# Patient Record
Sex: FEMALE | Race: BLACK OR AFRICAN AMERICAN | Employment: FULL TIME | URBAN - METROPOLITAN AREA
[De-identification: names, ages, dates, MRNs, and addresses within clinical notes are randomized per-mention and may not be internally consistent; named-entity substitution may affect disease eponyms.]

---

## 2018-03-29 ENCOUNTER — OFFICE VISIT (OUTPATIENT)
Dept: ENDOCRINOLOGY | Age: 33
End: 2018-03-29

## 2018-03-29 VITALS
SYSTOLIC BLOOD PRESSURE: 131 MMHG | OXYGEN SATURATION: 97 % | HEIGHT: 62 IN | RESPIRATION RATE: 18 BRPM | WEIGHT: 293 LBS | HEART RATE: 86 BPM | BODY MASS INDEX: 53.92 KG/M2 | TEMPERATURE: 98.1 F | DIASTOLIC BLOOD PRESSURE: 74 MMHG

## 2018-03-29 DIAGNOSIS — E66.01 CLASS 3 SEVERE OBESITY DUE TO EXCESS CALORIES WITH SERIOUS COMORBIDITY AND BODY MASS INDEX (BMI) OF 50.0 TO 59.9 IN ADULT (HCC): ICD-10-CM

## 2018-03-29 DIAGNOSIS — E78.2 MIXED HYPERLIPIDEMIA: ICD-10-CM

## 2018-03-29 DIAGNOSIS — E11.65 TYPE 2 DIABETES MELLITUS WITH HYPERGLYCEMIA, UNSPECIFIED LONG TERM INSULIN USE STATUS: Primary | ICD-10-CM

## 2018-03-29 DIAGNOSIS — I10 ESSENTIAL HYPERTENSION: ICD-10-CM

## 2018-03-29 LAB
GLUCOSE POC: 190 MG/DL
HBA1C MFR BLD HPLC: 9.9 %

## 2018-03-29 RX ORDER — INSULIN GLARGINE 100 [IU]/ML
73 INJECTION, SOLUTION SUBCUTANEOUS
COMMUNITY
End: 2018-03-29 | Stop reason: SDUPTHER

## 2018-03-29 RX ORDER — GLIMEPIRIDE 2 MG/1
TABLET ORAL
COMMUNITY
End: 2018-04-04 | Stop reason: ALTCHOICE

## 2018-03-29 RX ORDER — LISINOPRIL AND HYDROCHLOROTHIAZIDE 20; 25 MG/1; MG/1
TABLET ORAL DAILY
COMMUNITY

## 2018-03-29 RX ORDER — ATORVASTATIN CALCIUM 20 MG/1
TABLET, FILM COATED ORAL DAILY
COMMUNITY

## 2018-03-29 RX ORDER — LANCETS 33 GAUGE
EACH MISCELLANEOUS
Qty: 200 LANCET | Refills: 6 | Status: SHIPPED | OUTPATIENT
Start: 2018-03-29

## 2018-03-29 RX ORDER — INSULIN GLARGINE 100 [IU]/ML
70 INJECTION, SOLUTION SUBCUTANEOUS
Qty: 30 ML | Refills: 6 | Status: SHIPPED | OUTPATIENT
Start: 2018-03-29

## 2018-03-29 NOTE — PATIENT INSTRUCTIONS
Do not skip meals  Do not eat in between meals    Reduce carbs- pasta, rice, potatoes, bread   Do not drink juices or sodas  Do not eat peanut butter     Do not eat sugar free cookies and cakes   Do not eat peaches, grapes, pineapples , raisins and oranges     -------------------------------------------------------------------------------------------------------------------      Check blood sugars before meals and bed time     Stay on lantus 70 units  At night     Stop amaryl       My plan is to start on starlix 120 mg 3 times before each meal   After labs

## 2018-03-29 NOTE — MR AVS SNAPSHOT
49 Count includes the [facility] [address] 
[phone] Patient: Evelin Daily MRN: [id number] VGL:[id number] Visit Information Date & Time Provider Department Dept. Phone Encounter #  
 3/29/2018  3:00 PM Dea Cabral MD [facility] [phone] [id number] Follow-up Instructions Return in about 4 weeks (around 4/26/2018). Upcoming Health Maintenance Date Due DTaP/Tdap/Td series (1 - Tdap) 1/17/2006 PAP AKA CERVICAL CYTOLOGY 1/17/2006 Influenza Age 5 to Adult 8/1/2017 Allergies as of 3/29/2018  Review Complete On: 3/29/2018 By: Dea Cabral MD  
  
 Severity Noted Reaction Type Reactions Tradjenta [Linagliptin] High 03/29/2018    Anaphylaxis Aspirin  03/29/2018    Hives Metformin  03/29/2018    Hives Current Immunizations  Never Reviewed No immunizations on file. Not reviewed this visit You Were Diagnosed With   
  
 Codes Comments Type 2 diabetes mellitus with hyperglycemia, unspecified long term insulin use status (HCC)    -  Primary ICD-10-CM: E11.65 ICD-9-CM: 250.00 Vitals BP Pulse Temp Resp Height(growth percentile) Weight(growth percentile) 131/74 (BP 1 Location: Right arm, BP Patient Position: Sitting) 86 98.1 °F (36.7 °C) (Oral) 18 5' 2\" (1.575 m) 323 lb (146.5 kg) LMP SpO2 BMI OB Status Smoking Status 02/28/2018 97% 59.08 kg/m2 Having regular periods Current Every Day Smoker Vitals History BMI and BSA Data Body Mass Index Body Surface Area 59.08 kg/m 2 2.53 m 2 Your Updated Medication List  
  
   
This list is accurate as of 3/29/18  3:46 PM.  Always use your most recent med list.  
  
  
  
  
 atorvastatin 20 mg tablet Commonly known as:  LIPITOR Take  by mouth daily. glimepiride 2 mg tablet Commonly known as:  AMARYL Take  by mouth every morning. LANTUS SOLOSTAR U-100 INSULIN 100 unit/mL (3 mL) Inpn Generic drug:  insulin glargine 73 Units by SubCUTAneous route nightly. lisinopril-hydroCHLOROthiazide 20-25 mg per tablet Commonly known as:  Pamgarrett Colla Take  by mouth daily. We Performed the Following AMB POC GLUCOSE BLOOD, BY GLUCOSE MONITORING DEVICE [64142 CPT(R)] AMB POC HEMOGLOBIN A1C [87749 CPT(R)] C-PEPTIDE [id number] CPT(R)] GLUTAMIC ACID DECARB AB [82962 CPT(R)] LIPID PANEL [87505 CPT(R)] METABOLIC PANEL, COMPREHENSIVE [76767 CPT(R)] MICROALBUMIN, UR, RAND W/ MICROALB/CREAT RATIO [id number] CPT(R)] Follow-up Instructions Return in about 4 weeks (around 4/26/2018). Patient Instructions Do not skip meals Do not eat in between meals Reduce carbs- pasta, rice, potatoes, bread Do not drink juices or sodas Do not eat peanut butter Do not eat sugar free cookies and cakes Do not eat peaches, grapes, pineapples , raisins and oranges  
 
------------------------------------------------------------------------------------------------------------------- Check blood sugars before meals and bed time Stay on lantus 70 units  At night Stop amaryl My plan is to start on starlix 120 mg 3 times before each meal   After labs Introducing [facility]! [facility] introduces [employer] patient portal. Now you can access parts of your medical record, email your doctor's office, and request medication refills online. 1. In your internet browser, go to [email or web address] 2. Click on the First Time User? Click Here link in the Sign In box. You will see the New Member Sign Up page. 3. Enter your [employer] Access Code exactly as it appears below. You will not need to use this code after youve completed the sign-up process. If you do not sign up before the expiration date, you must request a new code. · [employer] Access Code: [email or web address] Expires: 6/27/2018  3:44 PM 
 
4. Enter the last four digits of your Social Security Number (xxxx) and Date of Birth (mm/dd/yyyy) as indicated and click Submit. You will be taken to the next sign-up page. 5. Create a [employer] ID. This will be your [employer] login ID and cannot be changed, so think of one that is secure and easy to remember. 6. Create a [employer] password. You can change your password at any time. 7. Enter your Password Reset Question and Answer. This can be used at a later time if you forget your password. 8. Enter your e-mail address. You will receive e-mail notification when new information is available in [address] 9. Click Sign Up. You can now view and download portions of your medical record. 10. Click the Download Summary menu link to download a portable copy of your medical information. If you have questions, please visit the Frequently Asked Questions section of the [employer] website. Remember, [employer] is NOT to be used for urgent needs. For medical emergencies, dial 911. Now available from your iPhone and Android! Please provide this summary of care documentation to your next provider. Your primary care clinician is listed as Fazal Pena. If you have any questions after today's visit, please call [phone].

## 2018-03-29 NOTE — PROGRESS NOTES
Wt Readings from Last 3 Encounters:   03/29/18 323 lb (146.5 kg)     Temp Readings from Last 3 Encounters:   03/29/18 98.1 °F (36.7 °C) (Oral)     BP Readings from Last 3 Encounters:   03/29/18 131/74     Pulse Readings from Last 3 Encounters:   03/29/18 86     No meter or logbook today.   Needs eye exam referral.

## 2018-03-29 NOTE — PROGRESS NOTES
HISTORY OF PRESENT ILLNESS  Justin Medina is a 35 y.o. female. HPI  Patient here for initial visit of Type 2 diabetes mellitus . Referred : by self/pcp    H/o diabetes since age 12       Current A1C is over 9 %  and symptoms/problems include polyuria and polydipsia     Current diabetic medications include lantus 73 units for 5 years , glimepride 2 mg a day . Has gained a lot of weight     Current monitoring regimen: home blood tests - daily  Home blood sugar records: trend: fluctuating a lot  Any episodes of hypoglycemia? Not sure     Weight trend: increasing steadily  Prior visit with dietician: no  Current diet: \"unhealthy\" diet in general  Current exercise: no regular exercise    Known diabetic complications: none  Cardiovascular risk factors: dyslipidemia, diabetes mellitus, obesity, sedentary life style    Eye exam current (within one year): yes  [place]: no     Past Medical History:   Diagnosis Date    Asthma     Diabetes ([facility] [address]     High cholesterol     Hypertension     Sleep apnea      History reviewed. No pertinent surgical history. Current Outpatient Prescriptions   Medication Sig    atorvastatin (LIPITOR) 20 mg tablet Take  by mouth daily.  glimepiride (AMARYL) 2 mg tablet Take  by mouth every morning.  lisinopril-hydroCHLOROthiazide (PRINZIDE, ZESTORETIC) 20-25 mg per tablet Take  by mouth daily.  insulin glargine (LANTUS SOLOSTAR U-100 INSULIN) 100 unit/mL (3 mL) inpn 73 Units by SubCUTAneous route nightly. No current facility-administered medications for this visit.         ROS    Physical Exam        Diabetic foot exam: March 2018     Left: Reflexes nd     Vibratory sensation normal    Proprioception nd   Sharp/dull discrimination nd    Filament test normal sensation with micro filament   Pulse DP: 2+ (normal)   Pulse PT: nd   Deformities: Yes - left black mole - painful       Right: Reflexes nd   Vibratory sensation normal   Proprioception nd   Sharp/dull discrimination nd   Filament test normal sensation with micro filament   Pulse DP: 2+ (normal)   Pulse PT: nd   Deformities: None    ASSESSMENT and PLAN    1. Type 2 DM, poorly controlled : a1c is 9%   Discussed DM 2 patho-physiology extensively     To rule out first if she is type 1     Educated her on the differences between the both     Continuing on lantus insulin for now     She will benefit from sensitizers if she is type 2- await labs     Patient is advised about checking blood sugars 4 times a day and maintaining log book. The danger of having low blood sugars has been explained with inappropriate use of insulin  Patient voiced understanding and using the printed instructions at home. Hypoglycemia management has been explained to the patient. Carbohydrate counting discussed with the patient     Stopping amaryl     lab results and schedule of future lab studies reviewed with patient  specific diabetic recommendations: diabetic diet discussed in detail, written exchange diet given, home glucose monitoring emphasized, home glucose monitoring demonstrated and taught, glucose meter dispensed to patient, all medications, side effects and compliance discussed carefully and use and side effects of insulin is taught    2. Hypoglycemia :  Educated on treating the hypoglycemia. Discussed Glucagon use and prescribed    3. HTN : continue prinizide . Patient is educated about importance of compliance with anti-hypertensives especially ARB/ACEI    4. Dyslipidemia : continue lipitor . Patient is educated about benefits and adverse effects of statins and explained how benefits outweigh risk.     5. use of aspirin to prevent MI and TIA's discussed      > 50 % of time is spent on counseling   Patient voiced understanding her plan of care

## 2018-04-02 ENCOUNTER — DOCUMENTATION ONLY (OUTPATIENT)
Dept: ENDOCRINOLOGY | Age: 33
End: 2018-04-02

## 2018-04-02 LAB
ALBUMIN SERPL-MCNC: 3.8 G/DL (ref 3.5–5.5)
ALBUMIN/CREAT UR: 9.9 MG/G CREAT (ref 0–30)
ALBUMIN/GLOB SERPL: 1.2 {RATIO} (ref 1.2–2.2)
ALP SERPL-CCNC: 42 IU/L (ref 39–117)
ALT SERPL-CCNC: 18 IU/L (ref 0–32)
AST SERPL-CCNC: 11 IU/L (ref 0–40)
BILIRUB SERPL-MCNC: <0.2 MG/DL (ref 0–1.2)
BUN SERPL-MCNC: 19 MG/DL (ref 6–20)
BUN/CREAT SERPL: 32 (ref 9–23)
C PEPTIDE SERPL-MCNC: 3.4 NG/ML (ref 1.1–4.4)
CALCIUM SERPL-MCNC: 9.5 MG/DL (ref 8.7–10.2)
CHLORIDE SERPL-SCNC: 98 MMOL/L (ref 96–106)
CHOLEST SERPL-MCNC: 207 MG/DL (ref 100–199)
CO2 SERPL-SCNC: 24 MMOL/L (ref 18–29)
CREAT SERPL-MCNC: 0.6 MG/DL (ref 0.57–1)
CREAT UR-MCNC: 54.6 MG/DL
GAD65 AB SER IA-ACNC: <5 U/ML (ref 0–5)
GFR SERPLBLD CREATININE-BSD FMLA CKD-EPI: 120 ML/MIN/1.73
GFR SERPLBLD CREATININE-BSD FMLA CKD-EPI: 139 ML/MIN/1.73
GLOBULIN SER CALC-MCNC: 3.1 G/DL (ref 1.5–4.5)
GLUCOSE SERPL-MCNC: 161 MG/DL (ref 65–99)
HDLC SERPL-MCNC: 34 MG/DL
INTERPRETATION, 910389: NORMAL
LDLC SERPL CALC-MCNC: 125 MG/DL (ref 0–99)
Lab: NORMAL
MICROALBUMIN UR-MCNC: 5.4 UG/ML
POTASSIUM SERPL-SCNC: 4.2 MMOL/L (ref 3.5–5.2)
PROT SERPL-MCNC: 6.9 G/DL (ref 6–8.5)
SODIUM SERPL-SCNC: 140 MMOL/L (ref 134–144)
TRIGL SERPL-MCNC: 238 MG/DL (ref 0–149)
VLDLC SERPL CALC-MCNC: 48 MG/DL (ref 5–40)

## 2018-04-02 NOTE — PROGRESS NOTES
Inform pt that she is Type 2 diabetic  by labs and   She can stop amaryl- which I said in the visit     To start on janumet xr 50/500 twice a day with meals     And     To start on starlix 120 mg 3 times a day

## 2018-04-05 RX ORDER — NATEGLINIDE 120 MG/1
120 TABLET ORAL
Qty: 90 TAB | Refills: 6 | Status: SHIPPED | OUTPATIENT
Start: 2018-04-05 | End: 2019-04-18 | Stop reason: SDUPTHER

## 2018-04-05 NOTE — TELEPHONE ENCOUNTER
But, her labs are like for type 2 patient and she needs to be on meds too-     Will  Try janumet 50/1000 one pill twice a day with meals       She can be on injectibles like bydureon to help her lose weight, will do at next visit

## 2019-04-19 ENCOUNTER — DOCUMENTATION ONLY (OUTPATIENT)
Dept: ENDOCRINOLOGY | Age: 34
End: 2019-04-19

## 2019-04-19 RX ORDER — NATEGLINIDE 120 MG/1
TABLET ORAL
Qty: 90 TAB | Refills: 5 | Status: SHIPPED | OUTPATIENT
Start: 2019-04-19

## 2019-04-19 NOTE — TELEPHONE ENCOUNTER
I have requested the nurse to cancel the refills for starlix and want pt to be seen in  Office   Last visit march 2018       Rosario Motley MD

## 2019-04-19 NOTE — PROGRESS NOTES
Starlix prescription cancelled at [facility] per MD. Patient needs to schedule an appointment.

## 2020-10-27 ENCOUNTER — HOSPITAL ENCOUNTER (EMERGENCY)
Age: 35
Discharge: HOME | End: 2020-10-27
Attending: FAMILY MEDICINE

## 2020-10-27 VITALS
RESPIRATION RATE: 24 BRPM | SYSTOLIC BLOOD PRESSURE: 138 MMHG | WEIGHT: 293 LBS | HEART RATE: 94 BPM | HEIGHT: 62 IN | TEMPERATURE: 98.3 F | BODY MASS INDEX: 53.92 KG/M2 | DIASTOLIC BLOOD PRESSURE: 104 MMHG | OXYGEN SATURATION: 98 %

## 2020-10-27 DIAGNOSIS — J02.0 STREP THROAT: Primary | ICD-10-CM

## 2020-10-27 PROCEDURE — 99282 EMERGENCY DEPT VISIT SF MDM: CPT

## 2020-10-27 RX ORDER — TRAZODONE HYDROCHLORIDE 50 MG/1
TABLET ORAL
COMMUNITY
Start: 2020-09-18

## 2020-10-27 RX ORDER — AMOXICILLIN 875 MG/1
875 TABLET, FILM COATED ORAL 2 TIMES DAILY
COMMUNITY

## 2020-10-27 RX ORDER — CETIRIZINE HCL 10 MG
TABLET ORAL
COMMUNITY
Start: 2020-09-18

## 2020-10-27 RX ORDER — DICLOFENAC SODIUM 50 MG/1
TABLET, DELAYED RELEASE ORAL
COMMUNITY
Start: 2020-07-21

## 2020-10-27 RX ORDER — METHOCARBAMOL 750 MG/1
TABLET, FILM COATED ORAL
COMMUNITY
Start: 2020-07-21

## 2020-10-27 RX ORDER — AZITHROMYCIN 250 MG/1
TABLET, FILM COATED ORAL
Qty: 6 TAB | Refills: 0 | Status: SHIPPED | OUTPATIENT
Start: 2020-10-27

## 2020-10-27 NOTE — ED TRIAGE NOTES
Pt was seen in ED for same on Sat 10/24/20. C/O approx 5 day history of throat pain, dental pain, and pt states it feels like \"gums are a little swollen\". Pt states PO intake is normal \"but it hurts to swallow\". Given Amoxicillin, but states \"I don't feel much better yet\", denies worsening symptoms. No acute distress moving air or swallowing secretions. Patent airway upon inspection with no obvious throat/airway/tonsillar swelling noted.

## 2020-10-27 NOTE — ED PROVIDER NOTES
[facility]    HISTORY AND PHYSICAL EXAM      Date: 10/27/2020  Patient Name: Mary Beth Goddard  Room:  [facility]    History of Presenting Illness     Chief Complaint:  Sore Throat and Dental Pain       History Provided By: Patient  HPI/ROS Limits: None    HPI: Heidy Valle is a 28 y.o. female presenting to the ED with CC of Sore Throat and Dental Pain   with initial onset 5 days. The patient was seen at [facility] [place] for same and was prescribed amoxicillin 3 times daily. Patient states minimal response to current antibiotics. Patient states gargling with warm salt water does provide some benefit. Currently, patient denies F/C, cough, hemoptysis, N/V/D/C, or urinary c/o. There are no other complaints, changes, or physical findings at this time. PCP: Jenniffer Mattson MD    No current facility-administered medications on file prior to encounter. Current Outpatient Medications on File Prior to Encounter   Medication Sig Dispense Refill    amoxicillin (AMOXIL) 875 mg tablet Take 875 mg by mouth two (2) times a day. Indications: strep throat      cetirizine (ZYRTEC) 10 mg tablet TAKE ONE TABLET BY MOUTH once DAILY      diclofenac EC (VOLTAREN) 50 mg EC tablet TAKE 1 TABLET BY MOUTH THREE TIMES A DAY      methocarbamoL (ROBAXIN) 750 mg tablet TAKE 1 TABLET BY MOUTH FOUR TIMES A DAY      traZODone (DESYREL) 50 mg tablet TAKE 1 OR 2 TABLETS BY MOUTH once DAILY AT night      nateglinide (STARLIX) 120 mg tablet TAKE ONE TABLET BY MOUTH BEFORE BREAKFAST LUNCH AND DINNER 90 Tab 5    SITagliptin-metFORMIN (JANUMET XR)  mg TM24 Take 1 Tab by mouth two (2) times daily (with meals). 60 Tab 6    atorvastatin (LIPITOR) 20 mg tablet Take  by mouth daily.  lisinopril-hydroCHLOROthiazide (PRINZIDE, ZESTORETIC) 20-25 mg per tablet Take  by mouth daily.       insulin glargine (LANTUS SOLOSTAR U-100 INSULIN) 100 unit/mL (3 mL) inpn 70 Units by SubCUTAneous route nightly. 30 mL 6    glucose blood VI test strips (ONETOUCH VERIO) strip Test 4 times daily. Dx code E11.65 200 Strip 6    lancets (ONE TOUCH DELICA) 33 gauge misc Test 4 times daily. Dx code E11.65 200 Lancet 6     Past History     PAST MEDICAL  The patient  has a past medical history of Asthma, Dental caries, Diabetes ([address] High cholesterol, Hypertension, Morbid obesity ([address] Psychiatric disorder, and Sleep apnea. PAST SURGICAL  The patient  has no past surgical history on file. SOCIAL HISTORY  The patient  reports that she has been smoking. She has been smoking about 0.25 packs per day. She has never used smokeless tobacco. She reports that she does not drink alcohol or use drugs. Allergies: The patient is allergic to tradjenta [linagliptin]; aspirin; and metformin. Review of Systems     Review of Systems   Constitutional: Negative. HENT: Positive for dental problem and sore throat. Eyes: Negative. Negative for visual disturbance. Respiratory: Negative for shortness of breath. Cardiovascular: Negative for chest pain. Gastrointestinal: Negative for abdominal pain. Endocrine: Negative. Genitourinary: Negative for dysuria, flank pain, frequency, urgency, vaginal bleeding and vaginal discharge. Musculoskeletal: Negative for back pain and neck pain. Skin: Negative. Allergic/Immunologic: Negative. Neurological: Negative. Hematological: Negative. Psychiatric/Behavioral: Negative. Physical Exam     Vital Signs-Reviewed the patient's vital signs. Patient Vitals for the past 24 hrs:   Temp Pulse Resp BP SpO2   10/27/20 1147 98.3 °F (36.8 °C) 94 24 (!) 138/104 98 %        Physical Exam  Vitals signs and nursing note reviewed. Constitutional:       Appearance: Normal appearance. She is well-developed and well-groomed. She is morbidly obese. HENT:      Head: Normocephalic and atraumatic.       Mouth/Throat:      Mouth: Mucous membranes are moist.      Pharynx: Oropharynx is clear. Uvula midline. No pharyngeal swelling, oropharyngeal exudate or posterior oropharyngeal erythema. Tonsils: No tonsillar exudate. Eyes:      Extraocular Movements: Extraocular movements intact. Conjunctiva/sclera: Conjunctivae normal.      Pupils: Pupils are equal, round, and reactive to light. Neck:      Musculoskeletal: Normal range of motion and neck supple. Cardiovascular:      Rate and Rhythm: Normal rate and regular rhythm. Pulmonary:      Effort: Pulmonary effort is normal.      Breath sounds: Normal breath sounds. Abdominal:      General: Abdomen is flat. Palpations: Abdomen is soft. Musculoskeletal: Normal range of motion. Lymphadenopathy:      Cervical: Cervical adenopathy present. Skin:     General: Skin is warm and dry. Neurological:      General: No focal deficit present. Mental Status: She is alert and oriented to person, place, and time. Psychiatric:         Mood and Affect: Mood normal.         Behavior: Behavior normal.       Diagnostic Study Results     Labs -   No results found for this or any previous visit (from the past 12 hour(s)). Radiologic Studies -   No orders to display     CT Results  (Last 48 hours)    None        CXR Results  (Last 48 hours)    None        Procedures/Critical Care     PROCEDURES  None    Medical Decision Making   I am the first provider for this patient. I reviewed the vital signs, available nursing notes, past medical history, past surgical history, family history and social history. Records Reviewed: Nursing Notes    ED Course:   Initial assessment performed. The patients presenting problems have been discussed, and they are in agreement with the care plan formulated and outlined with them. I have encouraged them to ask questions as they arise throughout their visit.          Medications - No data to display    Provider Notes (Medical Decision Making):   Patient presents to the ED with continued throat pain of strep throat after 3 days of antibiotics. Evaluation significant for continued tender cervical lymphadenopathy. Patient appears to be an antibiotic failure to amoxicillin. As a consequence, azithromycin prescribed. Recommend the patient continue supportive care. The patient has received maximum benefit from this visit and felt eligible for discharge. All questions were answered and concerns addressed. The patient was advised to follow up with PCP and/or return to the emergency department if condition worsens. The patient was discharged in stable condition. Diagnosis/Plan/Follow Up     CLINICAL IMPRESSION:      ICD-10-CM ICD-9-CM   1. Strep throat  J02.0 034.0        DISPOSITION:  Discharged to home/safe care in stable condition. PLAN/FOLLOW UP  1. Current Discharge Medication List      START taking these medications    Details   azithromycin (Zithromax Z-Maximilian) 250 mg tablet Take two tablets today then one tablet daily  Indications: strep throat  Qty: 6 Tab, Refills: 0           2. The patients results have been reviewed with them. The patient has been counseled regarding their diagnosis. The patient verbally conveys understanding and agreement of the signs, symptoms, diagnosis, treatment and prognosis and additionally agrees to follow up as recommended with their PCP. The patient also agrees with the care-plan and conveys that all of their questions have been answered. I have also put together some discharge instructions for them that include: 1) educational information regarding their diagnosis, 2) how to care for their diagnosis at home, as well as a 3) list of reasons why they would want to return to the ED prior to their follow-up appointment, should their condition change.     Follow-up Information     Follow up With Specialties Details Why Contact Delroy Vila MD Family Medicine   [address] [place] [id number] [phone]      [facility] DEPARTMENT Emergency Medicine Go in 3 days If symptoms worsen [address] [place] [id number] [phone]        Return to ED if worse       CHAYO Olivares M.D.   [facility] [place]  Emergency Department Physician

## 2023-05-20 RX ORDER — METHOCARBAMOL 750 MG/1
1 TABLET, FILM COATED ORAL 4 TIMES DAILY
COMMUNITY
Start: 2020-07-21

## 2023-05-20 RX ORDER — INSULIN GLARGINE 100 [IU]/ML
70 INJECTION, SOLUTION SUBCUTANEOUS
COMMUNITY
Start: 2018-03-29

## 2023-05-20 RX ORDER — LISINOPRIL AND HYDROCHLOROTHIAZIDE 25; 20 MG/1; MG/1
TABLET ORAL DAILY
COMMUNITY

## 2023-05-20 RX ORDER — AZITHROMYCIN 250 MG/1
TABLET, FILM COATED ORAL
COMMUNITY
Start: 2020-10-27

## 2023-05-20 RX ORDER — TRAZODONE HYDROCHLORIDE 50 MG/1
TABLET ORAL
COMMUNITY
Start: 2020-09-18

## 2023-05-20 RX ORDER — ATORVASTATIN CALCIUM 20 MG/1
TABLET, FILM COATED ORAL DAILY
COMMUNITY

## 2023-05-20 RX ORDER — CETIRIZINE HYDROCHLORIDE 10 MG/1
1 TABLET ORAL DAILY
COMMUNITY
Start: 2020-09-18

## 2023-05-20 RX ORDER — AMOXICILLIN 875 MG/1
875 TABLET, COATED ORAL 2 TIMES DAILY
COMMUNITY

## 2023-05-20 RX ORDER — NATEGLINIDE 120 MG/1
TABLET ORAL
COMMUNITY
Start: 2019-04-19